# Patient Record
Sex: FEMALE | Race: WHITE
[De-identification: names, ages, dates, MRNs, and addresses within clinical notes are randomized per-mention and may not be internally consistent; named-entity substitution may affect disease eponyms.]

---

## 2019-09-03 LAB
EOSINOPHIL NFR BLD: 1 % (ref 0–7)
ERYTHROCYTE [DISTWIDTH] IN BLOOD BY AUTOMATED COUNT: 13.4 % (ref 11.5–14.5)
HCT VFR BLD CALC: 38.3 % (ref 36–48)
HGB BLD-MCNC: 13.2 G/DL (ref 12–16)
LYMPHOCYTES NFR BLD AUTO: 46 % (ref 15–50)
MCH RBC QN AUTO: 31.8 PG (ref 26–34)
MCHC RBC AUTO-ENTMCNC: 34.5 G/DL (ref 31–37)
MCV RBC: 92.3 FL (ref 80–100)
MONOCYTES NFR BLD: 6 % (ref 2–11)
NEUTROPHILS NFR BLD AUTO: 46 % (ref 40–80)
PLATELET # BLD EST: NORMAL 10*3/UL
PLATELET # BLD: 221 10X3/UL (ref 130–400)
PMV BLD AUTO: 10.3 FL (ref 7.4–10.4)
RBC # BLD AUTO: 4.15 10X6/UL (ref 4–5.4)
WBC # BLD AUTO: 8.3 10X3/UL (ref 4.8–10.8)

## 2019-09-04 ENCOUNTER — HOSPITAL ENCOUNTER (OUTPATIENT)
Dept: HOSPITAL 84 - D.LD | Age: 69
LOS: 1 days | Discharge: HOME | End: 2019-09-05
Attending: OBSTETRICS & GYNECOLOGY
Payer: MEDICARE

## 2019-09-04 VITALS — BODY MASS INDEX: 32.17 KG/M2 | HEIGHT: 64 IN | WEIGHT: 188.4 LBS | BODY MASS INDEX: 32.17 KG/M2

## 2019-09-04 VITALS — DIASTOLIC BLOOD PRESSURE: 64 MMHG | SYSTOLIC BLOOD PRESSURE: 137 MMHG

## 2019-09-04 VITALS — SYSTOLIC BLOOD PRESSURE: 131 MMHG | DIASTOLIC BLOOD PRESSURE: 69 MMHG

## 2019-09-04 VITALS — DIASTOLIC BLOOD PRESSURE: 67 MMHG | SYSTOLIC BLOOD PRESSURE: 144 MMHG

## 2019-09-04 VITALS — SYSTOLIC BLOOD PRESSURE: 135 MMHG | DIASTOLIC BLOOD PRESSURE: 70 MMHG

## 2019-09-04 VITALS — SYSTOLIC BLOOD PRESSURE: 139 MMHG | DIASTOLIC BLOOD PRESSURE: 77 MMHG

## 2019-09-04 VITALS — SYSTOLIC BLOOD PRESSURE: 132 MMHG | DIASTOLIC BLOOD PRESSURE: 60 MMHG

## 2019-09-04 DIAGNOSIS — C54.1: Primary | ICD-10-CM

## 2019-09-04 DIAGNOSIS — Z30.432: ICD-10-CM

## 2019-09-04 DIAGNOSIS — Z01.812: ICD-10-CM

## 2019-09-04 NOTE — NUR
DR. JONES RETURNS CALL, PROGRESS REPORT GIVEN TO MD, AND INFORMED MD THAT
PT IS STATING NURSE IS SUPPOSED TO START DEFLATING THE NORRIS BULB PLACED IN
UTERUS.  PT IS ALSO ASKING ABOUT TAKING OUT THE URINARY NORRIS CATH.  DR. JONES STATES HE WILL PLACE ORDERS AS A NURSING MESSAGE FOR 0400 9/5/19, TO
DEFLATE NORRIS BULB IN UTERUS BY 15 CC'S, AND START PAD COUNT AT THAT TIME.
DR. JONES ALSO STATES IT IS UP TO THE PATIENT IF SHE WANTS TO TAKE OUT THE
URINARY NORRIS CATH NOW OR WAIT UNTIL AM, AS SHE WILL STILL HAVE THE UTERINE
NORRIS CATH IN PLACE.  TO PT'S ROOM, PLAN OF CARE DISCUSSED WITH PT.
PT OPTS TO LEAVE URINARY NORRIS CATH IN PLACE UNTIL AM.  SCANT AMOUNT OF BRIGHT
RED BLOOD NOTED ON PERIPAD, PERICARE DONE WITH WARM WET WASHCLOTHS, CHUX AND
PINK PAD CHANGED, AND NEW PERIPAD PLACED.  NORRIS CATH EMPTIED WITH TOTAL OF
1200 ML'S YELLOW URINE EMPTIED.  VS OBTAINED.  PT IS TALKING AND LAUGHING WITH
VISITOR WHILE VS ARE OBTAINED.  PT DENIES NAUSEA, PAIN, DIFFICULTY BREATHING,
DIZZINESS, OR SOB AT THIS TIME.  PT STATES THE PERCOCET DOES SEEM TO MAKE HER
MOUTH VERY DRY, OFFERED TO CALL MD TO CHANGE PAIN MED, PT STATES SHE WILL STAY
WITH THE PERCOCET FOR NOW.  LARGE MUG OF ICE WATER SERVED TO PT.  SRUP X2,
CALL LIGHT AND PHONE WITHIN REACH.

## 2019-09-04 NOTE — NUR
PT SITTING UP IN THE BED, HAS TOLERATED JELLO AND ICE WATER WITHOUT NAUSEA.
PT IS TALKING ON HER CELL PHONE. VISITOR AT BEDSIDE. PT DENIES ALL NEEDS AT
THIS TIME.

## 2019-09-04 NOTE — NUR
16FR NORRIS PLACED USING STERILE TECHNIQUE. INFLATED BALLOON WITH 10CC
OF FLUID, SECURED STAT LOCK TO L.INNER THIGH, IMMEDIATE FLOW OF 50CC
CLEAR YELLOW URINE. WILL HANG TO GRAVITY AS ORDERED VERBAL ORDER PER
. WILL CTM.

## 2019-09-04 NOTE — NUR
ASSESSMENT PER FLOW SHEET, VS OBTAINED, SALINE LOCK IN RIGHT HAND INTACT WITH
NO REDNESS OR EDEMA, SALINE LOCK FLUSHED WITH 10MLS OF NS WITH NO DIFFICULTY,
NORRIS CATH INTACT DRAINING CLEAR YELLOW URINE, EMPTIED 350 MLS FROM NORRIS
CHAMBER TO NORRIS BAG, UTERINE CATH IN PLACED, PAUL PAD CHANGED, SCANT BLEEDING
NOTED ON PAUL PAD, PT DENIES PAIN, REQUESTED AND SERVED FRESH H20, POC
DISCUSSED WITH PT REGARDING DR MURRAY ORDERS, PT VERBALIZES UNDERSTANDING,
PT DENIES FURTHER NEEDS AT THIS TIME

## 2019-09-04 NOTE — NUR
PT TO LABOR AND DELIVERY BY [facility] WITH BETH RECOVERY ROOM RN.
WILL RETURN TO RECEIVE PT TO 1220 WHEN THIS RN IS FINISHED WITH ANOTHER PT.

## 2019-09-04 NOTE — NUR
RECEIVED PT TO 1220 FROM RR RN, BETH, WITH REPORT RECEIVED PRIOR.  PT IS POST
D/C AND IUD REMOVAL.  ABDOMEN PALPATES SOFT.  PT HAS 30 ML NORRIS BULB PLACED
IN UTERUS BY MD, AND HAS NORRIS CATH DRAINING YELLOR URINE.  SCANT AMOUNT DRIED
BLOOD NOTED ON CHUX UNDERNEATH.  NO BRIGHT RED BLEEDING NOTED.  NO DRAINAGE
NOTED IN NORRIS BAG CONNECTED TO UTERINE NORRIS BULB.  PT DENIES PAIN OR NEEDS
AT THIS TIME.  PT DENIES NAUSEA, SOB, OR DIFFICULTY BREATHING.  SRUP X2, CALL
LIGHT AND PHONE WITHIN REACH.   AT BEDSIDE.

## 2019-09-04 NOTE — NUR
PT ON CALL LIGHT, PT REPORTS NO BM AT THIS TIME, ADM DELFINO AND VALENTIN PER
MD ORDERS, SEE EMAR, UTERINE CATH NOTED TO BE CLOTTED, WILL IRRIGATE

## 2019-09-04 NOTE — NUR
PT RESTING WITH EYES CLOSED, RESP QUIET, NO DISTRESS NOTED, LEFT UNDISTURBED
AT THIS TIME, BED IN LOW POSITION, SIDE RAILS X 2, CALL LIGHT IN REACH

## 2019-09-04 NOTE — NUR
PT ON CALL LIGHT, PT REPORTS NEEDING TO HAVE A BM, ASSISTED PT UP TO BR, GAIT
STEADY, PT INST TO USE CALL LIGHT FOR ANY ASSISTANCE WHILE IN BR OR CALL LIGHT
WHEN BACK TO BED

## 2019-09-04 NOTE — NUR
PT TRANSFERRED VIA WC TO ROOM 1257 WITH ALL BELONGINGS, PT ORIENTED TO ROOM,
BED IN LOW POSITION, SIDE RAILS X 2, CALL LIGHT IN REACH

## 2019-09-04 NOTE — NUR
IV TO LEFT AC DC'D PER VERBAL ORDER OF DR. JONES, AND 22 G CATH ON SECOND
ATTEMPT TO RIGHT HAND, SL AND FLUSHED WITH 10 CC'S NS, FLUSHES EASILY WITHOUT
REDNESS OR SWELLING TO IV SITE.  PT HAS FINISHED EATING HER LUNCH, AND DENIES
ALL OTHER NEEDS.  SRUP X2, CALL LIGHT AND PHONE WITHIN REACH.

## 2019-09-04 NOTE — NUR
DR JONES CALLS UNIT, GIVES ORDERS TO DEFLATE UTERINE CATH AT 0400 BY
15MLS ONLY, WATCH BLEEDING TILL 0600, IF NO BRIGHT RED OR BRISK BLEEDING , MAY
REMOVE UTERINE CATH AT 0700, IF BRIGHT RED OR BRISK BLEEDING NOTED, RE-INLFATE
UTERINE CATH BALLOON, MAKE PT NPO AFTER MIDNIGHT, AND INITIATE LR TO INFUSE AT
150 ML/HR AFTER MIDNIGHT, ORDERS READ BACK AND VERIFIED

## 2019-09-04 NOTE — NUR
PT IS SITTING UP IN THE BED, TALKING TO SISTER-IN-LAW.  MEDICATION ADM RECORD
REVIEWED WITH PT.  SEE EMAR FOR ALL MEDS ADM BY THIS RN.  ICE WATER AND JELLO
SERVED TO PT.  PT CONTINUES TO DENY NAUSEA, SOB, OR DIFFICULTY BREATHING.
SRUP X2, CALL LIGHT AND PHONE WITHIN REACH.

## 2019-09-04 NOTE — NUR
THIS RN AND CINDI REID, RN TO ROOM, UTERINE CATH IRRIGATED WITH 52 MLS OF
WATER AND 10 MLS OF NS, FLUSHED WITH NO DIFFICULTY, UTERINE CATH DRAINING
PROPERLY AT THIS TIME, INFORMED PT NOT TO GET UP BY SELF SINCE SHE HAS TAKEN
THE AMBIEN, PT VERBALIZES UNDERSTANDING, DENIES NEEDS AT THIS TIME, BED IN LOW
POSITION, SIDE RAILS X 2, CALL LIGHT IN REACH

## 2019-09-04 NOTE — NUR
DR JONES PHONES UNIT AND GIVES ORDER FOR CIPRO AND FLAGYL NOW, AND AMBIEN
AT BEDTIME. SEE  FOR DOSAGE DETAILS.

## 2019-09-04 NOTE — NUR
ADM FLAGYL PER MD ORDERS, SEE EMAR, PT INFORMED THAT I WILL BE MOVING HER
SHORTLY TO ANOTHER ROOM, PT VERBALIZES UNDERSTANDING, S/O AT BEDSIDE

## 2019-09-05 VITALS — DIASTOLIC BLOOD PRESSURE: 78 MMHG | SYSTOLIC BLOOD PRESSURE: 141 MMHG

## 2019-09-05 VITALS — DIASTOLIC BLOOD PRESSURE: 66 MMHG | SYSTOLIC BLOOD PRESSURE: 129 MMHG

## 2019-09-05 VITALS — SYSTOLIC BLOOD PRESSURE: 156 MMHG | DIASTOLIC BLOOD PRESSURE: 79 MMHG

## 2019-09-05 NOTE — NUR
PT AWAKE, PAD CHECKED, SCANT BLEEDING NOTED ON PAUL PAD, WILL CONTINUE TO
MONITOR, PT C/O SLIGHT CRAMPING, REQUESTS PAIN MED, WILL ADM PERCOCET, SCD'S
CONTINUE ON AND WORKING PROPERLY

## 2019-09-05 NOTE — NUR
VERBAL AND WRITTEN D/C INSTRUCTIONS GONE OVER WITHOUT QUESTIONS. PT STATES
UNDERSTANDING TO CALL OR RETURN TO ER IF SHE IS HAVING TO CHANGE PAUL PAD
MORE THAN ONCE AN HOUR. IV REMOVED FROM RIGHT HAND, CATH NOTED TO BE INTACT.
PT ASK THAT SCRIPT FOR PAIN MED BE CALLED TO [facility].

## 2019-09-05 NOTE — NUR
PT TALKING ON PHONE, PAD CHECKED, SCANT VAG BLEEDING WITH NO CLOTS, PT DENIES
NEEDS OR PAIN AT THIS TIME

## 2019-09-05 NOTE — NUR
DR JONES CALLS UNIT, REPORT OF VS, I&O, FLUSHING OF UTERINE CATH, UTERINE
DEFLATED 15MLS, SCANT-LITE VAG BLEEDING, PAIN MED ADM, ORDERS RECEIVED TO
REMOVE NORRIS CATH AND UTERINE CATH AFTER DAY SHIFT DOES ASSESSMENT, BEGIN PAD
COUNT, ORDERS READ BACK AND VERIFIED

## 2019-09-05 NOTE — NUR
ORDERS RECEIVED FROM DR JONES FOR REGULAR DIET ALSO PT TO AMBULATE ABOUT
ROM OR IN HALLS.  RECHECK PAD COUNT IN AN HOUR AND NOTIFY DR JONES.

## 2019-09-05 NOTE — NUR
Dr Ayala and this rn to bedside.  Pt is awake and alert asking md if she
could eat or drink coffee, he explains to her not at this time but possibly in
the next 1-2 hours.  Md goes over plan of care to remove jessica cath and
uterine pressure cath and begin pad count x 1hour.  pt states her
understanding and denies any questions or concerns.

## 2019-09-05 NOTE — NUR
PT CALLS FOR NURSE. THIS RN AT BEDSIDE, PT STATES THAT SHE HAS WENT TO
BATHROOM AND CHANGED PAUL PAD, SCANT BLEEDING NOTED AND DOES NOT SATURATE THRU
PAD. PT DENIES PAIN AND IS ASKING TO GO HOME.  WILL CALL REPORT TO MD.

## 2019-09-05 NOTE — NUR
PT RESTING WITH EYES CLOSED, AROUSES TO SOFT VERBAL STIMULATION, VS OBTAINED,
SALINE LOCK CONVERTED TO IV, LR INITIATED VIA PUMP INFUSING  ML/HR PER
MD ORDERS, SEE EMAR, NORRIS CATH EMPTIED, PAUL PAD CHANGED, SCANT VAG BLEEDING
NOTED, PT DENIES NEEDS OR PAIN AT THIS TIME, BED IN LOW POSITION, SIDE RAILS X
2, CALL LIGHT IN REACH

## 2019-09-05 NOTE — NUR
ADM PERCOCET PER MD ORDERS, SEE EMAR WITH A SMALL SIP OF H2O, CLEAN SHEET AND
BLANKET PROVIDED, SMALL DRAINAGE NOTED IN UTERINE BAG, PT DENIES FURTHER
NEEDS, BED IN LOW POSITION, SIDE RAILS X 2, CALL LIGHT IN REACH

## 2019-09-05 NOTE — NUR
Mi cath removed with 350ml clear urine noted.  Uterine pressure cath bulb
has 15ml remaining that is removed slowly by gravity,  small amount of
ser-sang fluid noted with removeal.  pt tolerates well and able to sit up on
side of the bed and ambulate to bathroom per self.  Voids about 100ml,  no
clots noted.  Praveena pad and mesh briefs provided and pt states understanding to
let nurse know when she voids next.  bed linens changed while she is in the
bathroom.

## 2019-09-05 NOTE — NUR
PT HAS NOT FELT ANY "GUSH" OF BLEEDING AND DENIES HAVING TO CHANGE PADS.  SHE
DOES GET UP TO BATHROOM SO THAT PAD COULD BE CHECKED.  LIGHT BLEEDING NOTED TO
PAD WITHOUT SATURATION.  WILL CALL REPORT TO DR JONES, PT UNDERSTANDS THAT
SHE STILL CAN NOT EAT OR DRINK UNTIL ORDER IS CHANGED BY MD. SHE DENIES PAIN
OR DISCOMFORT AT THIS TIME.

## 2019-09-05 NOTE — NUR
DR JONES CALLS UNIT, REPORT GIVEN THAT PT HAS CHANGED PAD TWICE SINCE 0745
AND THE BLEEDING HAS DECREASED FROM LIGHT TO SCANT, NO CLOTS NOTED. ALSO
REPORTED THAT PT HAS AMBULATED TO WAITING AREA X 2 WITHOUT COMPLAINT OF
INCREASED BLEEDING. MD UNDERSTANDS THAT PT IS DRESSED AND REQUESTING TO GO
HOME. ORDERS RECEIVED FOR TYLENOL 4, 1 PO EVERY 6HRS PRN PAIN #30, TO BE
CALLED IN TO PT PHARMACY OF CHOICE. MD REQUEST THAT PT BE MADE AWARE THAT HE
WOULD BE CALLING HER LATER ON TODAY TO GO OVER RESULTS FROM D&C.

## 2019-09-05 NOTE — NUR
PT AROUSES TO OPENING OF DOOR, POC DISCUSSED WITH PT, PT VERBALIZES
UNDERSTANDING, STATES "I DON'T WANT TO STAY HERE ALL DAY", INFORMED PT TO TALK
TO DR JONES ABOUT THAT, PT VERBALIZES UNDERSTANDING, SCANT VAG BLEEDING
NOTED WITH NO CLOTS, LITE DRAINAGE NOTED IN UTERINE CATH, PT REQUESTED WASH
CLOTH TO WAS FACE, 2 CUPS OF WATER PROVIDED TO BRUSH TEETH, PT INST NOT TO
DRINK THE WATER, PT VERBALIZES UNDERSTANDING, PT STATES "OH, I WON'T", PT INST
TO USE CALL LIGHT FOR ANY ASSISTANCE

## 2019-09-05 NOTE — NUR
THIS RN AND ZULAY CRAWFORD, CLAUDIA TO ROOM, VS OBTAINED, I&O'S COLLECTED, ZULAY CRAWFORD, CLAUDIA REMOVED 15 ML FROM UTERINE CATH BALLOON, CLEAN PAUL PADS APPLIED,
WILL CONTINUE TO MONITOR, PT DENIES NEEDS OR PAIN AT THIS TIME

## 2019-09-11 NOTE — OP
PATIENT NAME:  MEET LAO                           MEDICAL RECORD: [id number]
:48                                             LOCATION:D.OPS          
                                                         ADMISSION DATE:        
SURGEON:  PINO JONES MD          
 
 
DATE OF OPERATION:  2019
 
PREOPERATIVE DIAGNOSES:
1.  Postmenopausal bleeding.
2.  Intrauterine mass.
3.  Retained intrauterine device.
 
POSTOPERATIVE DIAGNOSES:
1.  Postmenopausal bleeding.
2.  Intrauterine mass.
3.  Retained intrauterine device.
 
PROCEDURE:
1.  Exam under anesthesia.
2.  Removal of Lippes loop IUD.
3.  Dilation and curettage.
 
SURGEON:  Pino Jones MD
 
CRNA:  Chivo Thomsa.
 
ANESTHETIC:  General.
 
FINDINGS:  Cervix is unremarkable.  IUD is removed with Burton stone forceps
and found to be a Lippes loop.  Copious amounts of tissues returned at the time
of curettage with active bleeding.
 
SPECIMENS REMOVED:
1.  Lippes loop.
2.  Endometrial curettings.
 
SPECIMEN DISPOSITION:  All specimens to pathology.
 
ESTIMATED BLOOD LOSS:  250 cc.
 
FLUID:  1200 cc lactated Ringer's.
 
URINE OUTPUT:  Quantity sufficient void prior to this procedure.
 
COMPLICATIONS:  Vaginal bleeding.
 
DRAINS:  Mi catheter and Mi bulb in the uterus.
 
INDICATIONS:  The patient is a 68-year-old female seen for intrauterine mass and
postmenopausal bleeding, 1 year ago.  The patient declined intervention at that
time.  The patient continues to have bleeding and presents for removal of IUD
and evaluation of postmenopausal bleeding.
 
DESCRIPTION OF PROCEDURE:  After informed consent was assured, the patient was
taken to the operating room where anesthetic was obtained.  The patient was
placed in [facility] and prepped and draped.  A speculum was introduced
in the vagina and the cervix was visualized.  Bimanual exam prior to this
 
 
 
OPERATIVE REPORT                               [id number]    MEET LAO         
 
 
reveals an enlarged uterus.  Cervix was grasped with single tooth tenaculum and
using Burton stone forceps, the IUD strings were grasped and the IUD pulled
through the cervical opening.  Using a needle , the lower body of the IUD
was grasped and it was removed from the uterus.  The active bleeding occurs
after removal of IUD.  Curettage now performed with copious returns of tissue. 
The patient continues to bleed after conclusion of curettage and a Mi
catheter was placed.  A 24-Italian Mi catheter was placed and inflated. 
Sponge, lap, and needle counts correct times 2.  The patient was taken down from
the stirrups and went to the recovery area in stable condition.  The patient
will be managed expectantly on the zaman.
 
TRANSINT:[id number] Voice Confirmation ID: [id number] DOCUMENT ID: [id number]
                                           
                                           PINO JONES MD          
 
 
 
Electronically Signed by PINO JONES on 19 at 0643
 
 
 
 
 
 
 
 
 
 
 
 
 
 
 
 
 
 
 
 
 
 
 
 
 
 
 
 
 
CC:                                                             [id number]
DICTATION DATE: 19 0744     :     19 1107      [facility] 
                                                                      19
[address] [address]

## 2019-10-15 ENCOUNTER — HOSPITAL ENCOUNTER (INPATIENT)
Dept: HOSPITAL 84 - D.ER | Age: 69
LOS: 1 days | Discharge: HOME | DRG: 189 | End: 2019-10-16
Attending: FAMILY MEDICINE | Admitting: FAMILY MEDICINE
Payer: MEDICARE

## 2019-10-15 VITALS — WEIGHT: 191 LBS | HEIGHT: 64 IN | BODY MASS INDEX: 32.61 KG/M2 | BODY MASS INDEX: 32.61 KG/M2

## 2019-10-15 VITALS — DIASTOLIC BLOOD PRESSURE: 57 MMHG | SYSTOLIC BLOOD PRESSURE: 136 MMHG

## 2019-10-15 VITALS — DIASTOLIC BLOOD PRESSURE: 66 MMHG | SYSTOLIC BLOOD PRESSURE: 126 MMHG

## 2019-10-15 DIAGNOSIS — Z85.42: ICD-10-CM

## 2019-10-15 DIAGNOSIS — E66.9: ICD-10-CM

## 2019-10-15 DIAGNOSIS — R53.1: ICD-10-CM

## 2019-10-15 DIAGNOSIS — E03.9: ICD-10-CM

## 2019-10-15 DIAGNOSIS — E83.51: ICD-10-CM

## 2019-10-15 DIAGNOSIS — J96.01: Primary | ICD-10-CM

## 2019-10-15 LAB
ALBUMIN SERPL-MCNC: 3.6 G/DL (ref 3.4–5)
ALP SERPL-CCNC: 100 U/L (ref 46–116)
ALT SERPL-CCNC: 22 U/L (ref 10–68)
ANION GAP SERPL CALC-SCNC: 11.3 MMOL/L (ref 8–16)
APPEARANCE UR: CLEAR
APTT BLD: 31.9 SECONDS (ref 22.8–39.4)
BASOPHILS NFR BLD AUTO: 0.2 % (ref 0–2)
BILIRUB SERPL-MCNC: 0.26 MG/DL (ref 0.2–1.3)
BILIRUB SERPL-MCNC: NEGATIVE MG/DL
BUN SERPL-MCNC: 19 MG/DL (ref 7–18)
CALCIUM SERPL-MCNC: 8.3 MG/DL (ref 8.5–10.1)
CHLORIDE SERPL-SCNC: 106 MMOL/L (ref 98–107)
CK MB SERPL-MCNC: 0.4 U/L (ref 0–3.6)
CK MB SERPL-MCNC: 0.5 U/L (ref 0–3.6)
CK MB SERPL-MCNC: 0.6 U/L (ref 0–3.6)
CK SERPL-CCNC: 41 UL (ref 21–215)
CK SERPL-CCNC: 45 UL (ref 21–215)
CK SERPL-CCNC: 56 UL (ref 21–215)
CO2 SERPL-SCNC: 30.7 MMOL/L (ref 21–32)
COLOR UR: YELLOW
CREAT SERPL-MCNC: 0.8 MG/DL (ref 0.6–1.3)
D DIMER PPP FEU-MCNC: 0.57 UG/MLFEU (ref 0.2–0.54)
EOSINOPHIL NFR BLD: 1.4 % (ref 0–7)
ERYTHROCYTE [DISTWIDTH] IN BLOOD BY AUTOMATED COUNT: 13.8 % (ref 11.5–14.5)
GLOBULIN SER-MCNC: 3.3 G/L
GLUCOSE SERPL-MCNC: 186 MG/DL (ref 74–106)
GLUCOSE SERPL-MCNC: NEGATIVE MG/DL
HCT VFR BLD CALC: 38.5 % (ref 36–48)
HGB BLD-MCNC: 12.9 G/DL (ref 12–16)
IMM GRANULOCYTES NFR BLD: 0.1 % (ref 0–5)
INR PPP: 1.07 (ref 0.85–1.17)
KETONES UR STRIP-MCNC: NEGATIVE MG/DL
LYMPHOCYTES NFR BLD AUTO: 49.7 % (ref 15–50)
MCH RBC QN AUTO: 31.3 PG (ref 26–34)
MCHC RBC AUTO-ENTMCNC: 33.5 G/DL (ref 31–37)
MCV RBC: 93.4 FL (ref 80–100)
MONOCYTES NFR BLD: 7.8 % (ref 2–11)
NEUTROPHILS NFR BLD AUTO: 40.8 % (ref 40–80)
NITRITE UR-MCNC: NEGATIVE MG/ML
NT-PROBNP SERPL-MCNC: 23 PG/ML (ref 0–125)
OSMOLALITY SERPL CALC.SUM OF ELEC: 293 MOSM/KG (ref 275–300)
PH UR STRIP: 8 [PH] (ref 5–6)
PLATELET # BLD: 263 10X3/UL (ref 130–400)
PMV BLD AUTO: 9.8 FL (ref 7.4–10.4)
POTASSIUM SERPL-SCNC: 4 MMOL/L (ref 3.5–5.1)
PROT SERPL-MCNC: 6.9 G/DL (ref 6.4–8.2)
PROT UR-MCNC: NEGATIVE MG/DL
PROTHROMBIN TIME: 13.5 SECONDS (ref 11.6–15)
RBC # BLD AUTO: 4.12 10X6/UL (ref 4–5.4)
SODIUM SERPL-SCNC: 144 MMOL/L (ref 136–145)
SP GR UR STRIP: 1.01 (ref 1–1.02)
TROPONIN I SERPL-MCNC: < 0.017 NG/ML (ref 0–0.06)
UROBILINOGEN UR-MCNC: NORMAL MG/DL
WBC # BLD AUTO: 8.6 10X3/UL (ref 4.8–10.8)

## 2019-10-15 NOTE — NUR
PATIENT SITTING UP IN BED. GAVE PATIENT CUP OF FRESH COFFEE. WILL CONTINUE TO
MONITOR. SR UP X 2 BED IN LOW POSITION AND CALL LIGHT IN REACH.

## 2019-10-15 NOTE — NUR
NEW PATIENT TO ROOM VIA WC ACCOMPANIED BY ER STAFF. PATIENT APPEARS IN NO
ACUTE DISTRES. PATIENT DENIES NEEDS OR 0PAIN. 20 G IV TO LT AC PATENT AND SL.
VSS. PATIENT CHANGED INTO HOSPITAL GOWN. DR KELLY IN ROOM. NEW ORDERS
RECEIVED. WILL CONTINUE WITH PLAN OF CARE. ORIENTED TO ROOM AND CL. SR UP X 2
BED IN LOW POSITION AND CALL LIGHT INR EACH.

## 2019-10-15 NOTE — NUR
EVENING ROUNDS COMPLETED. VSS, AAOX3, NO S/S OF RESPIRATORY DISTRESS. PT C/O
PAIN IN ABDOMEN. PRN PAIN MEDS GIVEN. SPOUSE AT BEDSIDE. PT DENIES ANY FURTHER
NEEDS AT THIS TIME. WILL CPOC. CL WITHIN REACH, BED IN LOW, SR UP X2.

## 2019-10-16 VITALS — DIASTOLIC BLOOD PRESSURE: 76 MMHG | SYSTOLIC BLOOD PRESSURE: 134 MMHG

## 2019-10-16 VITALS — SYSTOLIC BLOOD PRESSURE: 126 MMHG | DIASTOLIC BLOOD PRESSURE: 54 MMHG

## 2019-10-16 LAB
ALBUMIN SERPL-MCNC: 3.6 G/DL (ref 3.4–5)
ALP SERPL-CCNC: 81 U/L (ref 46–116)
ALT SERPL-CCNC: 23 U/L (ref 10–68)
ANION GAP SERPL CALC-SCNC: 16 MMOL/L (ref 8–16)
BASOPHILS NFR BLD AUTO: 0.2 % (ref 0–2)
BILIRUB SERPL-MCNC: 0.35 MG/DL (ref 0.2–1.3)
BUN SERPL-MCNC: 15 MG/DL (ref 7–18)
CALCIUM SERPL-MCNC: 8.9 MG/DL (ref 8.5–10.1)
CHLORIDE SERPL-SCNC: 104 MMOL/L (ref 98–107)
CK MB SERPL-MCNC: 0.7 U/L (ref 0–3.6)
CK SERPL-CCNC: 53 UL (ref 21–215)
CO2 SERPL-SCNC: 25.7 MMOL/L (ref 21–32)
CREAT SERPL-MCNC: 0.5 MG/DL (ref 0.6–1.3)
EOSINOPHIL NFR BLD: 2.9 % (ref 0–7)
ERYTHROCYTE [DISTWIDTH] IN BLOOD BY AUTOMATED COUNT: 14.3 % (ref 11.5–14.5)
GLOBULIN SER-MCNC: 3.1 G/L
GLUCOSE SERPL-MCNC: 111 MG/DL (ref 74–106)
HCT VFR BLD CALC: 40.5 % (ref 36–48)
HGB BLD-MCNC: 13.1 G/DL (ref 12–16)
IMM GRANULOCYTES NFR BLD: 0.2 % (ref 0–5)
LYMPHOCYTES NFR BLD AUTO: 50.6 % (ref 15–50)
MAGNESIUM SERPL-MCNC: 1.8 MG/DL (ref 1.8–2.4)
MCH RBC QN AUTO: 31 PG (ref 26–34)
MCHC RBC AUTO-ENTMCNC: 32.3 G/DL (ref 31–37)
MCV RBC: 95.7 FL (ref 80–100)
MONOCYTES NFR BLD: 8.6 % (ref 2–11)
NEUTROPHILS NFR BLD AUTO: 37.5 % (ref 40–80)
OSMOLALITY SERPL CALC.SUM OF ELEC: 282 MOSM/KG (ref 275–300)
PLATELET # BLD: 254 10X3/UL (ref 130–400)
PMV BLD AUTO: 10.1 FL (ref 7.4–10.4)
POTASSIUM SERPL-SCNC: 4.7 MMOL/L (ref 3.5–5.1)
PROT SERPL-MCNC: 6.7 G/DL (ref 6.4–8.2)
RBC # BLD AUTO: 4.23 10X6/UL (ref 4–5.4)
SODIUM SERPL-SCNC: 141 MMOL/L (ref 136–145)
TROPONIN I SERPL-MCNC: < 0.017 NG/ML (ref 0–0.06)
WBC # BLD AUTO: 8 10X3/UL (ref 4.8–10.8)

## 2019-10-16 NOTE — NUR
PATIENT LYING IN BED, IN NO ACUTE DISTRESS.  AWAKE, ALERT, AND ORIENTED X 3.
DENIES ANY PAIN, BED IN LOW POSITION, SR UP X 2. WILL CONTINUE TO MONITOR.

## 2019-10-16 NOTE — NUR
DISCHARGE INSTRUCTIONS REVIEWED WITH PATIENT.  IV DISCONTINUED PER PROTOCOL.
AMULATED TO FRONT DOOR AND LET WITH SPOUSE TO PRIVATE CAR.

## 2019-10-16 NOTE — NUR
DR. KELLY HERE TO SEE PATIENT.  OKAY TO DISCHARGE FROM HIS POINT OF VIEW.
DR. C[facility] CALLED RE; DISCHARGE ORDER.

## 2019-10-18 NOTE — CN
PATIENT NAME:MEET LAO                               MEDICAL RECORD: [id number]
: 48                                              LOCATION:D. D.1208
ADMIT DATE: 10/15/19                                       ACCOUNT: [id number]
CONSULTING PHYSICIAN:    AVA BYERS MD             
                                               
REFERRING PHYSICIAN:     COREY MACIAS MD                
 
 
DATE OF CONSULTATION:  10/16/2019
 
DIAGNOSES:
1.  Shortness of breath.
2.  Status post vaginal hysterectomy last week.
3.  Family history of coronary artery disease.
 
HISTORY OF PRESENT ILLNESS:  Mrs. Lao presents with worsening shortness of
breath.  She is status post vaginal hysterectomy last week; however, CTA was
with no evidence of pulmonary embolus.  She is on Eliquis.  She has a family
history of coronary artery disease.  Echocardiogram was overall normal.
 
PHYSICAL EXAMINATION:
CONSTITUTIONAL/GENERAL APPEARANCE:  Well nourished, well developed, appears
stated age.
EYES:  Lids and conjunctivae noninjected.  No discharge.  No pallor.
ENT:  Lips within normal limit.  No cyanosis.  No pallor.
NECK:  Carotid arteries, bilateral normal upstroke.  No bruits.  No thrills.  No
jugular venous pressure or distention.
CERVICAL LYMPH NODES:  Nontender.  Nonenlarged.
THYROID:  Not enlarged.  No nodules.
CARDIOVASCULAR:  Precordial exam, nondisplaced.  No heaves or pericardial
thrills.  Rate and rhythm, regular.  Heart sounds, normal S1, normal S2.  No S3,
no gallop, no rub.  Systolic murmur, not heard.  Diastolic murmur, not heard.
RESPIRATORY:  Respiratory effort, unlabored.  Normal curvature.  No thoracic
deformity.  No chest wall tenderness.  Percussion, resonant.  Auscultation,
clear.  No wheezes, no rales, no rhonchi.
ABDOMEN:  Soft, nondistended, nontender.  No abdominal pain, no vomiting and
normal appetite.
MUSCULOSKELETAL:  No joint tenderness, normal gait, normal tone.
SKIN:  Warm and dry.
 
REVIEW OF SYSTEMS:  The patient reports easy bruising but reports no swollen
glands. The patient reports no fever, no night sweats, no significant weight
gain, no significant weight loss.  No significant exercise tolerance.  The
patient reports no dry eyes, no irritation, no vision change.  Patient reports
no difficulty hearing and no ear pain.  Patient reports no frequent nose bleeds
or nose and sinus problems.  Patient reports on arm pain on exertion.   No
shortness of breath while lying down.  No history of heart murmur.  Patient
reports no cough, no wheezing or coughing up blood.  Patient reports no
abdominal pain, no vomiting.  Normal appetite.  No diarrhea and not vomiting
blood.  No nausea and no constipation.  Patient reports no incontinence.  No
difficulty urinating.  No hematuria.  No increased frequency.  Patient reports
no muscle aches.  No weakness, no arthralgias, no back pain.  No swelling of the
extremities.  Patient reports no abnormal mole, no jaundice, no rashes.  Reports
no loss of consciousness.  No weakness and no numbness.  No seizures, dizziness,
or headaches.  The patient reports no depression, no sleep disturbance, feeling
safe in a relationship and no alcohol abuse.  Patient reports on fatigue. 
Reports no runny nose or sinus pressure.  No itching, no hives, and no frequent
sneezing.  
 
 
 
CONSULT REPORT                                 [id number]    MEET LAO             
 
 
 
FAMILY HISTORY:  Negative for coronary artery disease, negative for
hypertension.
 
SOCIAL HISTORY:  She lives in the [place] area.  Denies smoking or ETOH.
 
OVERALL IMPRESSION:  Shortness of breath, very well may be cardiac in nature. 
She wants to leave today.  We will risk stratify with stress testing Cardiolite
imaging as an outpatient.
 
TRANSINT:[id number] Voice Confirmation ID: [id number] DOCUMENT ID: [id number]
                                           
                                           AVA BYERS MD             
 
 
 
Electronically Signed by AVA BYERS on 10/18/19 at 1328
 
 
 
 
 
 
 
 
 
 
 
 
 
 
 
 
 
 
 
 
 
 
 
 
 
 
 
 
 
 
CC:                                                             [id number]
DICTATION DATE: 10/16/19 1005     :     10/16/19 1047      DIS IN  
                                                                      10/16/19
[facility]                                          
[address] [place] [address]

## 2019-10-21 NOTE — EC
PATIENT:MEET LAO                 DATE OF SERVICE: 10/15/19
SEX: F                                  MEDICAL RECORD: [id number]
DATE OF BIRTH: [birth year]                        LOCATION:D.M3      D.120
AGE OF PATIENT: 68                             ADMISSION DATE: 10/15/19
 
REFERRING PHYSICIAN:                               
 
INTERPRETING PHYSICIAN: FLORI JENNINGS MD          
 
 
 
                             ECHOCARDIOGRAM REPORT
  ECHO CHARGES 4               ECHO COMPLETE                 Date: 10/16/19
 
 
 
CLINICAL DIAGNOSIS: SOB, HYPOXIA                  
 
                         ECHOCARDIOGRAPHIC MEASUREMENTS
      (adult normal given)
   AC root (d.<3.7cm) 3.1  cm   LV Septum d (<1.2 cm> 0.9  cm
      Valve Excursion 1.7  cm     LV Septum (systole) 1.1  cm
Left Atria (s.<4.0cm> 3.0  cm          LVPW d(<1.2cm) 1.0  cm
        RV (d.<2.3cm) 2.6  cm           LVPW (sytole) 1.2  cm
  LV diastole(<5.6CM) 5.3  cm       MV E-F(>70mm/sec)      cm
           LV systole 4.5  cm           LVOT Diameter 1.9  cm
       MV exc.(>10mm)      cm
Est.ejection fraction (50-75%)     %
 
   DOPPLER:
     LVIT      cm/sec A 78   cm/sec E 63    cm/sec
       LA      cm/sec      RVSP 16.4 mmHg
     LVOT 103  cm/sec   AOP1/2T      m/s
  Asc. Ao 157  cm/sec
     RVOT 94   cm/sec
       RA      cm/sec
       PA 94   cm/sec
 AV Gradient Peak 9.8  mmHg  AV Mean 5.3  mmHg  AV Area 2.0  cm
 MV Gradient Peak 2.4  mmHg  MV Mean 1.5  mmHg  MV Area      cm
   COMMENTS:                                              
 
 
 Cardiac Sonographer: Harsh GOTTI             
      Cardiologist: 3          Dr. Holloway             
             TAPE# [id number]           
                                       Pericardial Effusion N                        
 
 
DATE OF SERVICE:  
 
Adequate 2-D, color-flow and spectral Doppler, and M-mode.
 
No LVH.  LV internal dimensions are normal.  Wall motion is normal.  EF greater
than 55%.  Aortic valve is tricuspid.  No evidence of stenosis by Doppler
interrogation.  Left atrium is normal at 3.0 cm.  Mitral valve shows no
prolapse.  Trace MR.  Right-sided chambers grossly normal.  Trace TR.
 
TRANSINT:[id number] Voice Confirmation ID: [id number] DOCUMENT ID: [id number]
 
 
 
ECHOCARDIOGRAM REPORT                          [id number]    MEET LAO         
 
 
                                           
                                           FLORI JENNINGS MD          
 
 
 
Electronically Signed by FLORI JENNINGS on 10/21/19 at 1356
 
 
 
 
 
 
 
 
 
 
 
 
 
 
 
 
 
 
 
 
 
 
 
 
 
 
 
 
 
 
 
 
 
 
 
 
 
 
 
 
CC:                                                             [id number]
DICTATION DATE: 10/16/19 1338     :     10/17/19 0943      DIS IN  
                                                                      10/16/19
[facility]                                          
[address] [place] [address]

## 2020-08-31 ENCOUNTER — HOSPITAL ENCOUNTER (OUTPATIENT)
Dept: HOSPITAL 84 - D.RAD | Age: 70
Discharge: HOME | End: 2020-08-31
Attending: FAMILY MEDICINE
Payer: MEDICARE

## 2020-08-31 VITALS — BODY MASS INDEX: 32.8 KG/M2

## 2020-08-31 DIAGNOSIS — R13.19: Primary | ICD-10-CM
